# Patient Record
Sex: MALE | Race: BLACK OR AFRICAN AMERICAN | Employment: UNEMPLOYED | URBAN - METROPOLITAN AREA
[De-identification: names, ages, dates, MRNs, and addresses within clinical notes are randomized per-mention and may not be internally consistent; named-entity substitution may affect disease eponyms.]

---
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
## 2023-05-19 ENCOUNTER — TELEPHONE (OUTPATIENT)
Age: 23
End: 2023-05-19

## 2023-06-19 ENCOUNTER — TELEPHONE (OUTPATIENT)
Age: 23
End: 2023-06-19

## 2023-06-19 NOTE — TELEPHONE ENCOUNTER
Patient requesting ADHD & Autism testing/evaluation. ..he will be returning to school and needs to start on meds again. He was previously taking Vyanse. [facility] call for appt.     [phone]